# Patient Record
Sex: FEMALE | Race: WHITE | URBAN - METROPOLITAN AREA
[De-identification: names, ages, dates, MRNs, and addresses within clinical notes are randomized per-mention and may not be internally consistent; named-entity substitution may affect disease eponyms.]

---

## 2024-11-22 ENCOUNTER — OFFICE VISIT (OUTPATIENT)
Dept: URGENT CARE | Age: 64
End: 2024-11-22
Payer: COMMERCIAL

## 2024-11-22 VITALS
HEART RATE: 80 BPM | DIASTOLIC BLOOD PRESSURE: 70 MMHG | SYSTOLIC BLOOD PRESSURE: 124 MMHG | BODY MASS INDEX: 23.78 KG/M2 | WEIGHT: 148 LBS | OXYGEN SATURATION: 95 % | RESPIRATION RATE: 20 BRPM | HEIGHT: 66 IN | TEMPERATURE: 99 F

## 2024-11-22 DIAGNOSIS — L08.9 BACTERIAL SKIN INFECTION OF MOUTH: Primary | ICD-10-CM

## 2024-11-22 DIAGNOSIS — B96.89 BACTERIAL SKIN INFECTION OF MOUTH: Primary | ICD-10-CM

## 2024-11-22 RX ORDER — AMOXICILLIN AND CLAVULANATE POTASSIUM 875; 125 MG/1; MG/1
875 TABLET, FILM COATED ORAL 2 TIMES DAILY
Qty: 14 TABLET | Refills: 0 | Status: SHIPPED | OUTPATIENT
Start: 2024-11-22 | End: 2024-11-29

## 2024-11-22 ASSESSMENT — PAIN SCALES - GENERAL: PAINLEVEL_OUTOF10: 6

## 2024-11-22 NOTE — PROGRESS NOTES
"Subjective   Patient ID: Natalya Navarro is a 64 y.o. female. They present today with a chief complaint of Dental Pain (Tooth broke 2 weeks ago. Began to swell and become sensitive to touch 2 days ago. Throbbing pain causing headaches.).    History of Present Illness   64-year-old patient presents to clinic with complaints of broken  molar of the left lower jaw x 2 weeks ago with new onset of fevers, headaches,edema, erythema, pain of the gumline and left lower jaw as of 2 days ago.  Reports is awaiting appointment to be able to get dentures due to multiple dental caries and multiple broken teeth. Reports has tried Aleve without relief.  Denies chills, body aches, discharge, throat swelling, tongue swelling, inability to swallow, dizziness.    Past Medical History  Allergies as of 11/22/2024    (No Known Allergies)       (Not in a hospital admission)       History reviewed. No pertinent past medical history.    History reviewed. No pertinent surgical history.     reports that she has been smoking cigarettes. She uses smokeless tobacco.    Review of Systems  Review of Systems     ROS negative with the exception as noted on HPI                            Objective    Vitals:    11/22/24 1339   BP: 124/70   BP Location: Right arm   Patient Position: Sitting   BP Cuff Size: Adult   Pulse: 80   Resp: 20   Temp: 37.2 °C (99 °F)   TempSrc: Oral   SpO2: 95%   Weight: 67.1 kg (148 lb)   Height: 1.676 m (5' 6\")     No LMP recorded.    Physical Exam  Constitutional:       Appearance: Normal appearance.   HENT:      Head: Normocephalic and atraumatic.      Comments: Left lower jaw edema     Right Ear: Tympanic membrane, ear canal and external ear normal.      Left Ear: Tympanic membrane, ear canal and external ear normal.      Nose: Mucosal edema present. No rhinorrhea.      Right Sinus: No maxillary sinus tenderness or frontal sinus tenderness.      Left Sinus: No maxillary sinus tenderness or frontal sinus tenderness.      " Mouth/Throat:      Lips: Pink.      Mouth: Mucous membranes are moist. No oral lesions.      Dentition: Abnormal dentition (multiple cavities and broken molars. 2 broken molars on left lower jaw). Gingival swelling (left lower jaw) and dental caries present.      Tongue: No lesions. Tongue does not deviate from midline.      Palate: No mass and lesions.      Pharynx: No pharyngeal swelling, posterior oropharyngeal erythema, uvula swelling or postnasal drip.      Comments: 2 cm horizontal open wound with surrounding edema, erythema, and induration of left buccal mucosa.   Cardiovascular:      Rate and Rhythm: Normal rate and regular rhythm.      Heart sounds: No murmur heard.  Pulmonary:      Effort: Pulmonary effort is normal. No respiratory distress.      Breath sounds: Normal breath sounds. No stridor. No wheezing, rhonchi or rales.   Neurological:      Mental Status: She is alert.         Procedures    Point of Care Test & Imaging Results from this visit  No results found for this visit on 11/22/24.   No results found.    Diagnostic study results (if any) were reviewed by Kritsi Leung PA-C.    Assessment/Plan   Allergies, medications, history, and pertinent labs/EKGs/Imaging reviewed by Kristi Leung PA-C.   broken  molar of the left lower jaw x 2 weeks ago with new onset of fevers, headaches,edema, erythema, pain of the gumline and left lower jaw as of 2 days ago.   Augmentin started.  Patient is advised to keep area clean and dry is much as possible.  Advised to follow-up with dentist as soon as possible. Pt. Is advised to use nsaids/tylenol as needed for pain. May also try orajel. Uses warm/cool compresses. Avoid eating foods that are crunchy or difficult to chew. Monitor for worsening signs of infection such as worsening pain, fevers, chills, worsening swelling, severe headaches and if these occur proceed to the ED. Risk, benefits, and potential side effects of medication(s) discussed with pt.  Discussed disease/illness presentation, treatment options, progression, complications, and outcomes with patient. Pt. Has expressed understanding and is an agreement of plan of care.     Medical Decision Making      Orders and Diagnoses  There are no diagnoses linked to this encounter.    Medical Admin Record      Patient disposition: Home    Electronically signed by Kristi Leung PA-C  1:47 PM

## 2024-11-22 NOTE — PATIENT INSTRUCTIONS
Use warm compresses over the cheek  May try orajel for pain  as well as nsaids/tylenol   Monitor for worsening swelling, worsening fevers, chills, dizziness throat swelling and If these occur proceed to the ED.